# Patient Record
Sex: MALE | Race: WHITE | NOT HISPANIC OR LATINO | URBAN - NONMETROPOLITAN AREA
[De-identification: names, ages, dates, MRNs, and addresses within clinical notes are randomized per-mention and may not be internally consistent; named-entity substitution may affect disease eponyms.]

---

## 2019-05-21 ENCOUNTER — IMPORTED ENCOUNTER (OUTPATIENT)
Dept: URBAN - NONMETROPOLITAN AREA CLINIC 1 | Facility: CLINIC | Age: 69
End: 2019-05-21

## 2019-05-21 PROBLEM — Z96.1: Noted: 2019-05-21

## 2019-05-21 PROBLEM — H52.11: Noted: 2019-05-21

## 2019-05-21 PROBLEM — H52.4: Noted: 2019-05-21

## 2019-05-21 PROBLEM — H52.02: Noted: 2019-05-21

## 2019-05-21 PROBLEM — H52.221: Noted: 2019-05-21

## 2019-05-21 PROBLEM — E11.9: Noted: 2019-05-21

## 2019-05-21 PROBLEM — H25.12: Noted: 2019-05-21

## 2019-05-21 PROCEDURE — 92015 DETERMINE REFRACTIVE STATE: CPT

## 2019-05-21 PROCEDURE — 92014 COMPRE OPH EXAM EST PT 1/>: CPT

## 2019-05-21 NOTE — PATIENT DISCUSSION
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

## 2020-05-22 ENCOUNTER — IMPORTED ENCOUNTER (OUTPATIENT)
Dept: URBAN - NONMETROPOLITAN AREA CLINIC 1 | Facility: CLINIC | Age: 70
End: 2020-05-22

## 2020-05-22 PROCEDURE — 92014 COMPRE OPH EXAM EST PT 1/>: CPT

## 2020-05-22 PROCEDURE — 92015 DETERMINE REFRACTIVE STATE: CPT

## 2020-05-22 NOTE — PATIENT DISCUSSION
Type II DM w/o Retinopathy (Dx 2004)-  discussed findings w/patient-  condition controlled with oral medication-  no retinopathy seen today stable -  reviewed the importance of good blood sugar control and the negative effects of poorly controlled sugars on ocular health-  will send notes from today to Dr. Goff Sep-  monitor yearly or prn Nuclear Sclerosis OS-  discussed findings w/patient-  no treatment indicated at this time stable-  UV protection recommended-  monitor yearly or prn Pseudophakia OD-  discussed findings w/patient-  s/p YAG PC OD open capsule noted-  monitor yearly or prn Compound Myopic Astigmatism OD/Simple Hyperopia OS w/Presbyopia-  discussed findings w/patient-  new spectacle Rx issued-  monitor yearly or prn; 's Notes: MR 5/22/2020DFE 5/22/2020

## 2022-04-09 ASSESSMENT — VISUAL ACUITY
OU_CC: 20/30
OD_CC: 20/50-
OS_CC: 20/40
OD_PH: 20/30
OD_CC: 20/40
OS_PH: 20/30-
OS_CC: 20/50

## 2022-04-09 ASSESSMENT — TONOMETRY
OD_IOP_MMHG: 12
OS_IOP_MMHG: 12
OD_IOP_MMHG: 12
OS_IOP_MMHG: 14